# Patient Record
Sex: FEMALE
[De-identification: names, ages, dates, MRNs, and addresses within clinical notes are randomized per-mention and may not be internally consistent; named-entity substitution may affect disease eponyms.]

---

## 2020-10-09 LAB
ALBUMIN SERPL-MCNC: 4.7 G/DL (ref 3.5–5)
ALBUMIN/GLOB SERPL: 1.4 {RATIO} (ref 0.8–2)
ALP SERPL-CCNC: 57 IU/L (ref 40–150)
ALT SERPL-CCNC: 47 IU/L (ref 0–55)
ANION GAP SERPL CALC-SCNC: 13.5 MMOL/L (ref 8–16)
BASOPHILS # BLD AUTO: 0 10*3/UL (ref 0–0.1)
BASOPHILS NFR BLD AUTO: 0.3 % (ref 0–1)
BUN SERPL-MCNC: 26 MG/DL (ref 7–26)
BUN/CREAT SERPL: 28 (ref 6–25)
CALCIUM SERPL-MCNC: 9.3 MG/DL (ref 8.4–10.2)
CHLORIDE SERPL-SCNC: 103 MMOL/L (ref 98–107)
CO2 SERPL-SCNC: 26 MMOL/L (ref 22–29)
DEPRECATED NEUTROPHILS # BLD AUTO: 3.4 10*3/UL (ref 2.1–6.9)
EGFRCR SERPLBLD CKD-EPI 2021: > 60 ML/MIN (ref 60–?)
EOSINOPHIL # BLD AUTO: 0.1 10*3/UL (ref 0–0.4)
EOSINOPHIL NFR BLD AUTO: 2.2 % (ref 0–6)
ERYTHROCYTE [DISTWIDTH] IN CORD BLOOD: 13.6 % (ref 11.7–14.4)
GLOBULIN PLAS-MCNC: 3.3 G/DL (ref 2.3–3.5)
GLUCOSE SERPLBLD-MCNC: 108 MG/DL (ref 74–118)
HCT VFR BLD AUTO: 37.2 % (ref 34.2–44.1)
HGB BLD-MCNC: 12.2 G/DL (ref 12–16)
LYMPHOCYTES # BLD: 2.2 10*3/UL (ref 1–3.2)
LYMPHOCYTES NFR BLD AUTO: 35.2 % (ref 18–39.1)
MCH RBC QN AUTO: 28.8 PG (ref 28–32)
MCHC RBC AUTO-ENTMCNC: 32.8 G/DL (ref 31–35)
MCV RBC AUTO: 87.9 FL (ref 81–99)
MONOCYTES # BLD AUTO: 0.5 10*3/UL (ref 0.2–0.8)
MONOCYTES NFR BLD AUTO: 8.5 % (ref 4.4–11.3)
NEUTS SEG NFR BLD AUTO: 53.6 % (ref 38.7–80)
PLATELET # BLD AUTO: 217 X10E3/UL (ref 140–360)
POTASSIUM SERPL-SCNC: 4.5 MMOL/L (ref 3.5–5.1)
RBC # BLD AUTO: 4.23 X10E6/UL (ref 3.6–5.1)
SODIUM SERPL-SCNC: 138 MMOL/L (ref 136–145)

## 2020-10-09 NOTE — DIAGNOSTIC IMAGING REPORT
EXAMINATION:  CHEST 2 VIEWS    



INDICATION: Pre-operative



COMPARISON: None

     

FINDINGS:



LINES/TUBES:None



LUNGS:The lungs are well-inflated. No focal consolidation or pulmonary edema.



PLEURA:No pleural effusion or pneumothorax.



MEDIASTINUM:The cardiomediastinal silhouette appears normal in size and shape.

Atherosclerotic calcifications of the thoracic aorta.



BONES/SOFT TISSUES:No acute osseous injury.



ABDOMEN:No free air under the diaphragm.





IMPRESSION: 

No focal pneumonia or pulmonary edema.



Signed by: Terra Mejia MD on 10/9/2020 12:23 PM

## 2020-10-13 ENCOUNTER — HOSPITAL ENCOUNTER (OUTPATIENT)
Dept: HOSPITAL 88 - OR | Age: 65
Setting detail: OBSERVATION
LOS: 1 days | Discharge: HOME | End: 2020-10-14
Attending: OBSTETRICS & GYNECOLOGY | Admitting: OBSTETRICS & GYNECOLOGY
Payer: MEDICARE

## 2020-10-13 VITALS — DIASTOLIC BLOOD PRESSURE: 63 MMHG | SYSTOLIC BLOOD PRESSURE: 102 MMHG

## 2020-10-13 VITALS — SYSTOLIC BLOOD PRESSURE: 127 MMHG | DIASTOLIC BLOOD PRESSURE: 59 MMHG

## 2020-10-13 VITALS — DIASTOLIC BLOOD PRESSURE: 58 MMHG | SYSTOLIC BLOOD PRESSURE: 96 MMHG

## 2020-10-13 VITALS — BODY MASS INDEX: 28.34 KG/M2 | HEIGHT: 64 IN | WEIGHT: 166 LBS

## 2020-10-13 VITALS — SYSTOLIC BLOOD PRESSURE: 102 MMHG | DIASTOLIC BLOOD PRESSURE: 63 MMHG

## 2020-10-13 DIAGNOSIS — Z11.59: ICD-10-CM

## 2020-10-13 DIAGNOSIS — D25.2: ICD-10-CM

## 2020-10-13 DIAGNOSIS — E78.5: ICD-10-CM

## 2020-10-13 DIAGNOSIS — D25.1: ICD-10-CM

## 2020-10-13 DIAGNOSIS — D25.0: ICD-10-CM

## 2020-10-13 DIAGNOSIS — Z01.818: ICD-10-CM

## 2020-10-13 DIAGNOSIS — Z01.810: ICD-10-CM

## 2020-10-13 DIAGNOSIS — Z01.812: ICD-10-CM

## 2020-10-13 DIAGNOSIS — I10: ICD-10-CM

## 2020-10-13 DIAGNOSIS — N81.3: Primary | ICD-10-CM

## 2020-10-13 DIAGNOSIS — K21.9: ICD-10-CM

## 2020-10-13 PROCEDURE — 57260 CMBN ANT PST COLPRHY: CPT

## 2020-10-13 PROCEDURE — 36415 COLL VENOUS BLD VENIPUNCTURE: CPT

## 2020-10-13 PROCEDURE — 86900 BLOOD TYPING SEROLOGIC ABO: CPT

## 2020-10-13 PROCEDURE — 85025 COMPLETE CBC W/AUTO DIFF WBC: CPT

## 2020-10-13 PROCEDURE — 80053 COMPREHEN METABOLIC PANEL: CPT

## 2020-10-13 PROCEDURE — 93005 ELECTROCARDIOGRAM TRACING: CPT

## 2020-10-13 PROCEDURE — 58260 VAGINAL HYSTERECTOMY: CPT

## 2020-10-13 PROCEDURE — 86850 RBC ANTIBODY SCREEN: CPT

## 2020-10-13 PROCEDURE — 71046 X-RAY EXAM CHEST 2 VIEWS: CPT

## 2020-10-13 PROCEDURE — 88307 TISSUE EXAM BY PATHOLOGIST: CPT

## 2020-10-13 RX ADMIN — SODIUM CHLORIDE, POTASSIUM CHLORIDE, SODIUM LACTATE AND CALCIUM CHLORIDE SCH MLS/HR: 600; 310; 30; 20 INJECTION, SOLUTION INTRAVENOUS at 12:22

## 2020-10-13 RX ADMIN — SODIUM CHLORIDE, POTASSIUM CHLORIDE, SODIUM LACTATE AND CALCIUM CHLORIDE SCH MLS/HR: 600; 310; 30; 20 INJECTION, SOLUTION INTRAVENOUS at 21:00

## 2020-10-13 RX ADMIN — HYDROCODONE BITARTRATE AND ACETAMINOPHEN PRN EA: 10; 325 TABLET ORAL at 14:25

## 2020-10-13 NOTE — NUR
RECEIVED PT IN BED AOX3 KOVACS DRAINING CLEAR YELLOW URINE ,DENIES PAIN NOW VAGINAL PACKING 
IN PLACE .RT HAND 20 G LR  CC/HR .CALL LIGHT WITH IN REACH ,CONTINUE TO MONITOR

## 2020-10-13 NOTE — NUR
Received patient from PACU. Vital signs stable, no s/s of distress. Baca draining clear 
yellow urine. Vaginal packing in place. Patient oriented to room and call light. Bed low, 
wheels locked, side rails x2. Call light in reach will continue to monitor patient.

## 2020-10-13 NOTE — OPERATIVE REPORT
DATE OF PROCEDURE:  

 

SURGEON:  Xochilt Willis MD

 

PREOPERATIVE DIAGNOSIS:  Procidentia.

 

POSTOPERATIVE DIAGNOSIS:  Procidentia.

 

PROCEDURES:  Vaginal hysterectomy, anterior-posterior repair, sacrospinous colpopexy.

 

ASSISTANT:  Dr. Ángela Tapia.

 

COMPLICATIONS:  None.

 

ESTIMATED BLOOD LOSS:  50 mL.

 

DESCRIPTION OF PROCEDURE:  The patient was taken to the OR.  General anesthesia was

induced.  She was prepped and draped in a normal sterile fashion, placed in dorsal

lithotomy position.  After examination under anesthesia, a small atrophic uterus

procidentia, thick vaginal mucosa, subvaginal tissue was injected with lidocaine with

epinephrine 0.25% around the cervix, and circumferential vaginal skin incision was made

with a scalpel.  The bladder was dissected off the cervix using both curved Corral

scissors and gentle sweeps of latex wrapped on the index finger.  Pouch of Randolph was

opened with Metzenbaum scissors and using the LigaSure the uterosacrals were held,

cauterized and cut, followed by the uterine vessels on uterine vessels were cauterized

and cut on both sides.  Following this, the probe ligament apex on each side of the

uterus was held with a curved zeppelin clamp, and the uterus was freed and sent to

pathology.  The pedicles were secured with transfixion suture of Vicryl 0.  Hemostasis

was found to be adequate.  A pursestring suture of catgut 3-0 was placed on the bladder

to reduce the size of the bladder.  Pubocervical ligaments approximated using Vicryl 0

interrupted sutures.  Excess vaginal skin was trimmed off.  Pelvic fascia was pierced

with the index finger, hooked around the anterior pubic ramus on each side and discussed

sacrospinous ligament were palpated.  Using the Capio needle beltrán, Vicryl suture was

placed on the sacrospinous ligament and the other end was hitched to the vaginal fold.

The same was repeated on the other side.  Vagina was closed with interlocking sutures of

continuous Vicryl 2-0 and sacrospinous sutures were tied and vagina was lifted.

Following this, posterior repair was performed with two Allis clamps, applied about 1 cm

from the fourchette and the posterior vaginal wall was injected with lidocaine with

epinephrine, and a mucocutaneous junction was excised using curved Corral scissors.  The

vagina was dissected off the underlying tissue using both sharp and blunt dissection.

The perineal muscles were approximated using Vicryl 2-0 and excess vaginal skin was

trimmed off using curved Corral scissors.  The vagina was closed with interlocking

stitches of Vicryl 2-0 and the superficial perineal muscles were approximated with the

same suture and perineal skin was approximated using Vicryl 2-0 stitch.  Vaginal pack

was inserted and the Baca catheter revealed clear urine.  The patient tolerated the

procedure well.  Lap, instruments, and needle counts were correct x2 at the end of

procedure. 

 

 

 

 

______________________________

Xochilt Willis MD DD/MARCELO

D:  10/13/2020 10:05:51

T:  10/13/2020 18:18:18

Job #:  [id number]

## 2020-10-14 VITALS — SYSTOLIC BLOOD PRESSURE: 91 MMHG | DIASTOLIC BLOOD PRESSURE: 56 MMHG

## 2020-10-14 VITALS — DIASTOLIC BLOOD PRESSURE: 54 MMHG | SYSTOLIC BLOOD PRESSURE: 102 MMHG

## 2020-10-14 VITALS — SYSTOLIC BLOOD PRESSURE: 110 MMHG | DIASTOLIC BLOOD PRESSURE: 57 MMHG

## 2020-10-14 VITALS — DIASTOLIC BLOOD PRESSURE: 57 MMHG | SYSTOLIC BLOOD PRESSURE: 110 MMHG

## 2020-10-14 VITALS — DIASTOLIC BLOOD PRESSURE: 55 MMHG | SYSTOLIC BLOOD PRESSURE: 90 MMHG

## 2020-10-14 LAB
BASOPHILS # BLD AUTO: 0 10*3/UL (ref 0–0.1)
BASOPHILS NFR BLD AUTO: 0.1 % (ref 0–1)
DEPRECATED NEUTROPHILS # BLD AUTO: 7.7 10*3/UL (ref 2.1–6.9)
EOSINOPHIL # BLD AUTO: 0 10*3/UL (ref 0–0.4)
EOSINOPHIL NFR BLD AUTO: 0 % (ref 0–6)
ERYTHROCYTE [DISTWIDTH] IN CORD BLOOD: 14 % (ref 11.7–14.4)
HCT VFR BLD AUTO: 28.8 % (ref 34.2–44.1)
HGB BLD-MCNC: 9.3 G/DL (ref 12–16)
LYMPHOCYTES # BLD: 1.7 10*3/UL (ref 1–3.2)
LYMPHOCYTES NFR BLD AUTO: 16.2 % (ref 18–39.1)
MCH RBC QN AUTO: 28.3 PG (ref 28–32)
MCHC RBC AUTO-ENTMCNC: 32.3 G/DL (ref 31–35)
MCV RBC AUTO: 87.5 FL (ref 81–99)
MONOCYTES # BLD AUTO: 1.1 10*3/UL (ref 0.2–0.8)
MONOCYTES NFR BLD AUTO: 10.3 % (ref 4.4–11.3)
NEUTS SEG NFR BLD AUTO: 73.2 % (ref 38.7–80)
PLATELET # BLD AUTO: 205 X10E3/UL (ref 140–360)
RBC # BLD AUTO: 3.29 X10E6/UL (ref 3.6–5.1)

## 2020-10-14 RX ADMIN — HYDROCODONE BITARTRATE AND ACETAMINOPHEN PRN EA: 10; 325 TABLET ORAL at 05:59

## 2020-10-14 RX ADMIN — SODIUM CHLORIDE, POTASSIUM CHLORIDE, SODIUM LACTATE AND CALCIUM CHLORIDE SCH MLS/HR: 600; 310; 30; 20 INJECTION, SOLUTION INTRAVENOUS at 05:57

## 2020-10-14 NOTE — NUR
Pt discharged home at this time. Pt verbalized understanding of all discharge instructions 
and follow up appointments. 0 s/s of acute distress noted at time of discharge.

## 2020-10-14 NOTE — NUR
Vaginal packing removed per physician orders. Minimal discomfort noted to patient. No 
bleeding noted.

## 2020-10-14 NOTE — NUR
PT DENIES PAIN DURING THE NIGHT ,NO ACUTE DISTRESS NOTED ,CALL LIGHT WITH IN REACH ,CONTINUE 
TO MONITOR